# Patient Record
Sex: MALE | Race: WHITE | NOT HISPANIC OR LATINO | Employment: OTHER | URBAN - METROPOLITAN AREA
[De-identification: names, ages, dates, MRNs, and addresses within clinical notes are randomized per-mention and may not be internally consistent; named-entity substitution may affect disease eponyms.]

---

## 2021-08-12 LAB — CRC RECOMMENDATION EXT: NORMAL

## 2023-04-03 ENCOUNTER — CLINICAL SUPPORT (OUTPATIENT)
Dept: AUDIOLOGY | Facility: CLINIC | Age: 58
End: 2023-04-03
Payer: COMMERCIAL

## 2023-04-03 ENCOUNTER — OFFICE VISIT (OUTPATIENT)
Dept: OTOLARYNGOLOGY | Facility: CLINIC | Age: 58
End: 2023-04-03
Payer: COMMERCIAL

## 2023-04-03 VITALS — WEIGHT: 173.06 LBS | TEMPERATURE: 98 F

## 2023-04-03 DIAGNOSIS — H90.A22 SENSORINEURAL HEARING LOSS (SNHL) OF LEFT EAR WITH RESTRICTED HEARING OF RIGHT EAR: ICD-10-CM

## 2023-04-03 DIAGNOSIS — H93.12 TINNITUS, LEFT: Primary | ICD-10-CM

## 2023-04-03 DIAGNOSIS — H93.12 TINNITUS, LEFT: ICD-10-CM

## 2023-04-03 DIAGNOSIS — H90.3 SENSORINEURAL HEARING LOSS, ASYMMETRICAL: ICD-10-CM

## 2023-04-03 DIAGNOSIS — H90.3 SENSORINEURAL HEARING LOSS (SNHL), BILATERAL: Primary | ICD-10-CM

## 2023-04-03 PROCEDURE — 99203 PR OFFICE/OUTPT VISIT, NEW, LEVL III, 30-44 MIN: ICD-10-PCS | Mod: S$GLB,,, | Performed by: STUDENT IN AN ORGANIZED HEALTH CARE EDUCATION/TRAINING PROGRAM

## 2023-04-03 PROCEDURE — 99999 PR PBB SHADOW E&M-EST. PATIENT-LVL III: ICD-10-PCS | Mod: PBBFAC,,, | Performed by: STUDENT IN AN ORGANIZED HEALTH CARE EDUCATION/TRAINING PROGRAM

## 2023-04-03 PROCEDURE — 92567 PR TYMPA2METRY: ICD-10-PCS | Mod: S$GLB,,, | Performed by: AUDIOLOGIST-HEARING AID FITTER

## 2023-04-03 PROCEDURE — 92557 COMPREHENSIVE HEARING TEST: CPT | Mod: S$GLB,,, | Performed by: AUDIOLOGIST-HEARING AID FITTER

## 2023-04-03 PROCEDURE — 99203 OFFICE O/P NEW LOW 30 MIN: CPT | Mod: S$GLB,,, | Performed by: STUDENT IN AN ORGANIZED HEALTH CARE EDUCATION/TRAINING PROGRAM

## 2023-04-03 PROCEDURE — 92557 PR COMPREHENSIVE HEARING TEST: ICD-10-PCS | Mod: S$GLB,,, | Performed by: AUDIOLOGIST-HEARING AID FITTER

## 2023-04-03 PROCEDURE — 92567 TYMPANOMETRY: CPT | Mod: S$GLB,,, | Performed by: AUDIOLOGIST-HEARING AID FITTER

## 2023-04-03 PROCEDURE — 99999 PR PBB SHADOW E&M-NEW PATIENT-LVL II: ICD-10-PCS | Mod: PBBFAC,,, | Performed by: AUDIOLOGIST-HEARING AID FITTER

## 2023-04-03 PROCEDURE — 99999 PR PBB SHADOW E&M-NEW PATIENT-LVL II: CPT | Mod: PBBFAC,,, | Performed by: AUDIOLOGIST-HEARING AID FITTER

## 2023-04-03 PROCEDURE — 99999 PR PBB SHADOW E&M-EST. PATIENT-LVL III: CPT | Mod: PBBFAC,,, | Performed by: STUDENT IN AN ORGANIZED HEALTH CARE EDUCATION/TRAINING PROGRAM

## 2023-04-03 RX ORDER — ASPIRIN 81 MG/1
81 TABLET ORAL DAILY
COMMUNITY

## 2023-04-03 RX ORDER — FINASTERIDE 5 MG/1
5 TABLET, FILM COATED ORAL DAILY
COMMUNITY

## 2023-04-03 RX ORDER — TAMSULOSIN HYDROCHLORIDE 0.4 MG/1
CAPSULE ORAL DAILY
COMMUNITY

## 2023-04-03 RX ORDER — EZETIMIBE 10 MG/1
10 TABLET ORAL DAILY
COMMUNITY
End: 2023-11-01 | Stop reason: SDUPTHER

## 2023-04-03 RX ORDER — NIACIN 500 MG
250 CAPSULE, EXTENDED RELEASE ORAL NIGHTLY
COMMUNITY

## 2023-04-03 RX ORDER — LISINOPRIL 20 MG/1
20 TABLET ORAL DAILY
COMMUNITY
End: 2023-09-06 | Stop reason: SDUPTHER

## 2023-04-03 NOTE — PATIENT INSTRUCTIONS
Tinnitus (ringing in the ears)  Tinnitus is the perception of sound when no actual external noise is present. While it is commonly referred to as ringing in the ears, tinnitus can manifest many different perceptions of sound, including buzzing, hissing, whistling, swooshing, and clicking.     Preventative measures to help prevent and minimize tinnitus include:     Reduce exposure to extremely loud noise  Avoid total silence  Decrease salt intake  Monitor one's blood pressure  Avoid stimulants such as caffeine and nicotine  Exercise  Reduce fatigue  Manage stress    Sound Therapy: Sound therapy is a broad term that may be used in many ways. In general, sound therapy means the use of external noise in order to alter your perception of tinnitus. Like other tinnitus treatments, sound therapies do not cure the condition, but they may significantly lower the burden and intensity of tinnitus. Some options for sound therapy include:    Portable sound generator/sound machines: these can be purchased at certain electronic suppliers. They can produce soothing sounds that help to dampen your brain's recognition of the tinnitus.   Example of a portable and affordable sound machine: https://[employer]Gene/Portable-Relaxing-Soothing-Charging-Auto-Off/dp/D19A0DQYKJ/ref=pd_lpo_2?pd_rd_i=P09Q2ZBEIS&psc=1    Home masking: such as the use of electric fans, radios or television to dampen the tinnitus.    Music therapy: Many patients find that music, particularly classical passages that don't contain wide variations in loudness (ampliltude) can be both soothing to the limbic system (the emotional processor in the brain that is commonly negatively linked to a patient's reaction to tinnitus) and stimulating to the auditory cortex. There are several Apps available that have preselected music that can help with tinnitus. Some examples include: Audio Care, [place], Beltone Tinnitus Calmer, and MyNoise, among others.    Amplification: The use  of hearing aids and a combination of hearing aids and maskers are often effective ways to minimize tinnitus. While it is not clear whether hearing aids help by amplifying background sounds that can mask out the tinnitus or by actually altering the production of tinnitus, most hearing aid wearers report at least some reduction in their tinnitus. This may be due to the reduction in contrast between tinnitus and silence, or because of the new stimulation provided to the brain. This an especially helpful option when the patient also suffers from hearing loss as the hearing aids may simultaneously improving the hearing and tinnitus.    Tinnitus Retraining Therapy (TRT) does exist, but is not offered by any providers in our state. If all other measures fail and your tinnitus is debilitating it might be worth searching for TRT providers in nearby states. These techniques consist of two main components -- directive counseling and low level sound generators. Directive counseling provides intensive, individualized education regarding the causes and effects of tinnitus on the ear, the brain, and the coping mechanism. Low-level sound generators may help the brain relearn a pattern that will de-emphasize the importance of the tinnitus. These devices also may be helpful in desensitizing patients who are overly sensitive to sound.    There are also support groups that exist which can be helpful for some patients.     More helpful information can be found on the American Tinnitus Association website: https://www.estela.org/

## 2023-04-03 NOTE — PROGRESS NOTES
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  Patient complains of increase in left-sided tinnitus. Onset of symptoms was gradual starting a few years ago with acute worsening about 2-3 months. No inciting incident. A few months ago had pulsatile tinnitus AS that resolved after a few days, and following he has noted the ringing in his left ear has gotten louder. He does not appreciate changes in hearing. No known hearing loss. He has history of occupational noise history ([employer]); consistently used hearing protection. No dizziness.     Results reveal a normal-to-moderate sensorineural hearing loss 250-8000 Hz bilaterally with an asymmetry for the left ear at 5843-3949 Hz. Speech Reception Thresholds were 15 dBHL for the right ear and 15 dBHL for the left ear.   Word recognition scores were excellent for the right ear and excellent for the left ear.   Tympanograms were Type A for the right ear and Type A for the left ear.    Patient was counseled on the above findings.    Recommendations:  ENT  Audiologic monitoring for asymmetry in hearing (consider ABR or MRI, and/or audiogram monitoring)

## 2023-04-03 NOTE — PROGRESS NOTES
Chief complaint:   Chief Complaint   Patient presents with    Tinnitus          Referring Provider:  Lucrecia Self  No address on file    History of Present Illness:     Mr. Gross is a 57 y.o. male presenting for evaluation of tinnitus.     Patient presents with tinnitus. Onset of symptoms was gradual starting a few years ago with acute worsening about 2-3 months. No inciting incident. Had pulsating ringing  in the left ear around that time, but resolved after a few days. Then non-pulsatile ringing worsened after that.      Patient describes the tinnitus as constant located in the left ear. The quality is described as medium range pitch. The pattern is nonpulsatile with an intensity that is medium. Patient describes his level of annoyance as minimally annoying, always aware. Associated symptoms include no hearing loss, pain, dizziness, drainage or recurrent otitis. Previous treatments include none.      The patient also denies pain deep within the ear, tenderness around the ear canal or pre-auricular area, or headaches.         History        Past Medical History: No past medical history on file. .          Past Surgical History:No past surgical history on file..         Medications: Medication list was reviewed. He  has a current medication list which includes the following prescription(s): aspirin, ezetimibe, finasteride, lisinopril, niacin, and tamsulosin.         Allergies: Review of patient's allergies indicates:  No Known Allergies         Family history: family history is not on file.         Social History          Alcohol use:  has no history on file for alcohol use.            Tobacco:  reports that he has never smoked. He has never used smokeless tobacco.         Please see the patient's intake form for full details of past medical history, past surgical history, family history, social history and review of systems. ?This information was reviewed by me and noted.      Physical Examination     General:  Well developed, well nourished, well hydrated. Verbal with a strong voice and not dysphonic.     Head/Face: Normocephalic, atraumatic. No scars or lesions. Facial musculature equal.     Eyes: No scleral icterus or conjunctival hemorrhage. EOMI. PERRLA.     Ears:     Right ear: No gross deformity. EAC is clear of debris and erythema. The TM is intact with a pneumatized middle ear. No signs of retraction, fluid or infection.      Left ear: No gross deformity. EAC is clear of debris and erythema. The TM is intact with a pneumatized middle ear. No signs of retraction, fluid or infection.       Neurologic: Moving all extremities without gross abnormality.CN II-XII grossly intact. House-Brackmann 1/6. No signs of nystagmus.      Psych: Alert and oriented to person, place, and time with an appropriate mood and affect.       Audiogram     Audiogram was independently reviewed          Assessment     1. Sensorineural hearing loss (SNHL), bilateral    2. Tinnitus, left        Plan:      The diagnosis and options of management were discussed at length with the patient, including hearing function, hearing conservation, tinnitus management, and the risks of my recommendations. We spent a considerable amount of time discussing drug therapy, antioxidants, biofeedback, masking and sound therapy for tinnitus. I answered all questions in layman's terms. I recommend conservative therapies. Handout provided.     For his asymmetric Sensorineural Hearing Loss, it is relatively mild and not meeting MRI criteria. However, since his symptoms have recently changed I do recommend repeat audiogram in 6 months, sooner if he notices a change.     PT was likely physiologic. No other concerning symptoms. Has not recurred now in over 3 months. He will return if it recurs.                Spencer Orlando MD  [facility]   [facility]  [id number] [place] [address].  MIYA Snowden [id number]  P: [phone]  F:  [phone]

## 2023-06-15 ENCOUNTER — PATIENT MESSAGE (OUTPATIENT)
Dept: PRIMARY CARE CLINIC | Facility: CLINIC | Age: 58
End: 2023-06-15
Payer: COMMERCIAL

## 2023-06-21 ENCOUNTER — LAB VISIT (OUTPATIENT)
Dept: LAB | Facility: HOSPITAL | Age: 58
End: 2023-06-21
Attending: FAMILY MEDICINE
Payer: COMMERCIAL

## 2023-06-21 ENCOUNTER — OFFICE VISIT (OUTPATIENT)
Dept: INTERNAL MEDICINE | Facility: CLINIC | Age: 58
End: 2023-06-21
Payer: COMMERCIAL

## 2023-06-21 VITALS
HEIGHT: 68 IN | DIASTOLIC BLOOD PRESSURE: 80 MMHG | HEART RATE: 78 BPM | BODY MASS INDEX: 26.53 KG/M2 | SYSTOLIC BLOOD PRESSURE: 120 MMHG | WEIGHT: 175.06 LBS | OXYGEN SATURATION: 98 % | TEMPERATURE: 96 F

## 2023-06-21 DIAGNOSIS — G25.81 RLS (RESTLESS LEGS SYNDROME): ICD-10-CM

## 2023-06-21 DIAGNOSIS — E78.2 MODERATE MIXED HYPERLIPIDEMIA NOT REQUIRING STATIN THERAPY: ICD-10-CM

## 2023-06-21 DIAGNOSIS — Z00.00 ENCOUNTER FOR MEDICAL EXAMINATION TO ESTABLISH CARE: ICD-10-CM

## 2023-06-21 DIAGNOSIS — Z00.00 ENCOUNTER FOR MEDICAL EXAMINATION TO ESTABLISH CARE: Primary | ICD-10-CM

## 2023-06-21 PROBLEM — I10 ESSENTIAL HYPERTENSION: Status: ACTIVE | Noted: 2018-02-16

## 2023-06-21 PROBLEM — E11.9 DIABETES: Status: ACTIVE | Noted: 2018-02-16

## 2023-06-21 LAB
BASOPHILS # BLD AUTO: 0.05 K/UL (ref 0–0.2)
BASOPHILS NFR BLD: 0.9 % (ref 0–1.9)
DIFFERENTIAL METHOD: NORMAL
EOSINOPHIL # BLD AUTO: 0.1 K/UL (ref 0–0.5)
EOSINOPHIL NFR BLD: 1.1 % (ref 0–8)
ERYTHROCYTE [DISTWIDTH] IN BLOOD BY AUTOMATED COUNT: 12.2 % (ref 11.5–14.5)
FERRITIN SERPL-MCNC: 56 NG/ML (ref 20–300)
HCT VFR BLD AUTO: 45.7 % (ref 40–54)
HGB BLD-MCNC: 14.7 G/DL (ref 14–18)
IMM GRANULOCYTES # BLD AUTO: 0.02 K/UL (ref 0–0.04)
IMM GRANULOCYTES NFR BLD AUTO: 0.4 % (ref 0–0.5)
IRON SERPL-MCNC: 71 UG/DL (ref 45–160)
LYMPHOCYTES # BLD AUTO: 1.3 K/UL (ref 1–4.8)
LYMPHOCYTES NFR BLD: 23.5 % (ref 18–48)
MAGNESIUM SERPL-MCNC: 2.2 MG/DL (ref 1.6–2.6)
MCH RBC QN AUTO: 30.8 PG (ref 27–31)
MCHC RBC AUTO-ENTMCNC: 32.2 G/DL (ref 32–36)
MCV RBC AUTO: 96 FL (ref 82–98)
MONOCYTES # BLD AUTO: 0.4 K/UL (ref 0.3–1)
MONOCYTES NFR BLD: 7.9 % (ref 4–15)
NEUTROPHILS # BLD AUTO: 3.7 K/UL (ref 1.8–7.7)
NEUTROPHILS NFR BLD: 66.2 % (ref 38–73)
NRBC BLD-RTO: 0 /100 WBC
PLATELET # BLD AUTO: 234 K/UL (ref 150–450)
PMV BLD AUTO: 11.3 FL (ref 9.2–12.9)
RBC # BLD AUTO: 4.78 M/UL (ref 4.6–6.2)
SATURATED IRON: 17 % (ref 20–50)
TOTAL IRON BINDING CAPACITY: 414 UG/DL (ref 250–450)
TRANSFERRIN SERPL-MCNC: 280 MG/DL (ref 200–375)
TSH SERPL DL<=0.005 MIU/L-ACNC: 0.49 UIU/ML (ref 0.4–4)
WBC # BLD AUTO: 5.58 K/UL (ref 3.9–12.7)

## 2023-06-21 PROCEDURE — 36415 COLL VENOUS BLD VENIPUNCTURE: CPT | Performed by: FAMILY MEDICINE

## 2023-06-21 PROCEDURE — 84443 ASSAY THYROID STIM HORMONE: CPT | Performed by: FAMILY MEDICINE

## 2023-06-21 PROCEDURE — 82728 ASSAY OF FERRITIN: CPT | Performed by: FAMILY MEDICINE

## 2023-06-21 PROCEDURE — 85025 COMPLETE CBC W/AUTO DIFF WBC: CPT | Performed by: FAMILY MEDICINE

## 2023-06-21 PROCEDURE — 99204 OFFICE O/P NEW MOD 45 MIN: CPT | Mod: S$GLB,,, | Performed by: FAMILY MEDICINE

## 2023-06-21 PROCEDURE — 99204 PR OFFICE/OUTPT VISIT, NEW, LEVL IV, 45-59 MIN: ICD-10-PCS | Mod: S$GLB,,, | Performed by: FAMILY MEDICINE

## 2023-06-21 PROCEDURE — 83735 ASSAY OF MAGNESIUM: CPT | Performed by: FAMILY MEDICINE

## 2023-06-21 PROCEDURE — 84466 ASSAY OF TRANSFERRIN: CPT | Performed by: FAMILY MEDICINE

## 2023-06-21 PROCEDURE — 99999 PR PBB SHADOW E&M-EST. PATIENT-LVL III: ICD-10-PCS | Mod: PBBFAC,,, | Performed by: FAMILY MEDICINE

## 2023-06-21 PROCEDURE — 99999 PR PBB SHADOW E&M-EST. PATIENT-LVL III: CPT | Mod: PBBFAC,,, | Performed by: FAMILY MEDICINE

## 2023-06-21 RX ORDER — MULTIVITAMIN
1 TABLET ORAL DAILY
COMMUNITY

## 2023-06-21 RX ORDER — GABAPENTIN 300 MG/1
300 CAPSULE ORAL NIGHTLY
Qty: 30 CAPSULE | Refills: 11 | Status: SHIPPED | OUTPATIENT
Start: 2023-06-21 | End: 2023-11-01

## 2023-06-21 RX ORDER — AMOXICILLIN 500 MG
CAPSULE ORAL DAILY
COMMUNITY

## 2023-06-21 RX ORDER — CHOLECALCIFEROL (VITAMIN D3) 25 MCG
1000 TABLET ORAL DAILY
COMMUNITY

## 2023-06-21 NOTE — PROGRESS NOTES
Quique Gross  06/21/2023  [id number]    Primary Doctor No  Patient Care Team:  Primary Doctor No as PCP - General          Visit Type: New patient    Chief Complaint:  Chief Complaint   Patient presents with    Insomnia    restless leg     bilateral       History of Present Illness:  57 year old   New patient    Reports RLS and sleeplessness  He reports problems with sleeping.     History of BPH  On Proscar, Flomax.    HTN control with lisinopril    He does exercise 5 days a week  No issues with claudication.  He said at night, he feels that his legs have to move.  He does have to move.  Prolonged sitting, makes it worse.    Does have some insomnia, which he does not contribute to the RLS symptoms          History:  Past Medical History:   Diagnosis Date    Hypertension      Past Surgical History:   Procedure Laterality Date    HERNIA REPAIR       Family History   Problem Relation Age of Onset    Hypertension Mother     Dementia Mother      Social History     Socioeconomic History    Marital status:    Tobacco Use    Smoking status: Never    Smokeless tobacco: Never   Substance and Sexual Activity    Alcohol use: Yes     Comment: OCCASIONALLY     Patient Active Problem List   Diagnosis    Essential hypertension    Moderate mixed hyperlipidemia not requiring statin therapy     Review of patient's allergies indicates:  No Known Allergies    The following were reviewed at this visit: active problem list, medication list, allergies, family history, social history, and health maintenance.    Medications:  Current Outpatient Medications on File Prior to Visit   Medication Sig Dispense Refill    aspirin (ECOTRIN) 81 MG EC tablet Take 81 mg by mouth once daily.      ezetimibe (ZETIA) 10 mg tablet Take 10 mg by mouth once daily.      finasteride (PROSCAR) 5 mg tablet Take 5 mg by mouth once daily.      lisinopriL (PRINIVIL,ZESTRIL) 20 MG tablet Take 20 mg by mouth once daily.      multivitamin (ONE DAILY MULTIVITAMIN)  per tablet Take 1 tablet by mouth once daily.      niacin 500 MG CpSR Take 250 mg by mouth every evening.      omega-3 fatty acids/fish oil (FISH OIL-OMEGA-3 FATTY ACIDS) 300-1,000 mg capsule Take by mouth once daily.      tamsulosin (FLOMAX) 0.4 mg Cap Take by mouth once daily.      vitamin D (VITAMIN D3) 1000 units Tab Take 1,000 Units by mouth once daily.       No current facility-administered medications on file prior to visit.       Medications have been reviewed and reconciled with patient at this visit.  Barriers to medications reviewed with patient.    Adverse reactions to current medications reviewed with patient..    Over the counter medications reviewed and reconciled with patient.    Exam:  Wt Readings from Last 3 Encounters:   06/21/23 79.4 kg (175 lb 0.7 oz)   04/03/23 78.5 kg (173 lb 1 oz)     Temp Readings from Last 3 Encounters:   06/21/23 96 °F (35.6 °C) (Tympanic)   04/03/23 98.2 °F (36.8 °C)     BP Readings from Last 3 Encounters:   06/21/23 120/80     Pulse Readings from Last 3 Encounters:   06/21/23 78     Body mass index is 26.62 kg/m².      ROS  Physical Exam    Laboratory Reviewed ({N/A)  No results found for: WBC, HGB, HCT, PLT, CHOL, TRIG, HDL, LDLDIRECT, ALT, AST, NA, K, CL, CREATININE, BUN, CO2, TSH, PSA, INR, GLUF, HGBA1C, MICROALBUR    Quique was seen today for insomnia and restless leg.    Diagnoses and all orders for this visit:    Encounter for medical examination to establish care  -     TSH; Future  -     CBC Auto Differential; Future    RLS (restless legs syndrome)  -     FERRITIN; Future  -     Iron and TIBC; Future  -     Magnesium; Future    Moderate mixed hyperlipidemia not requiring statin therapy    Other orders  -     gabapentin (NEURONTIN) 300 MG capsule; Take 1 capsule (300 mg total) by mouth every evening.                Care Plan/Goals: Reviewed    Goals    None         Follow up: No follow-ups on file.    After visit summary was printed and given to patient upon  discharge today.  Patient goals and care plan are included in After Visit Summary.

## 2023-06-23 ENCOUNTER — PATIENT MESSAGE (OUTPATIENT)
Dept: INTERNAL MEDICINE | Facility: CLINIC | Age: 58
End: 2023-06-23
Payer: COMMERCIAL

## 2023-07-06 ENCOUNTER — PATIENT MESSAGE (OUTPATIENT)
Dept: INTERNAL MEDICINE | Facility: CLINIC | Age: 58
End: 2023-07-06
Payer: COMMERCIAL

## 2023-07-07 RX ORDER — PRAMIPEXOLE DIHYDROCHLORIDE 0.12 MG/1
0.12 TABLET ORAL NIGHTLY
Qty: 30 TABLET | Refills: 11 | Status: SHIPPED | OUTPATIENT
Start: 2023-07-07 | End: 2024-02-26 | Stop reason: SDUPTHER

## 2023-07-08 ENCOUNTER — PATIENT MESSAGE (OUTPATIENT)
Dept: ADMINISTRATIVE | Facility: HOSPITAL | Age: 58
End: 2023-07-08
Payer: COMMERCIAL

## 2023-07-12 DIAGNOSIS — I10 ESSENTIAL HYPERTENSION: ICD-10-CM

## 2023-09-05 ENCOUNTER — PATIENT MESSAGE (OUTPATIENT)
Dept: INTERNAL MEDICINE | Facility: CLINIC | Age: 58
End: 2023-09-05
Payer: COMMERCIAL

## 2023-09-06 RX ORDER — LISINOPRIL 20 MG/1
20 TABLET ORAL DAILY
Qty: 90 TABLET | Refills: 1 | Status: SHIPPED | OUTPATIENT
Start: 2023-09-06 | End: 2023-11-01 | Stop reason: SDUPTHER

## 2023-10-09 ENCOUNTER — OFFICE VISIT (OUTPATIENT)
Dept: OTOLARYNGOLOGY | Facility: CLINIC | Age: 58
End: 2023-10-09
Payer: COMMERCIAL

## 2023-10-09 ENCOUNTER — CLINICAL SUPPORT (OUTPATIENT)
Dept: AUDIOLOGY | Facility: CLINIC | Age: 58
End: 2023-10-09
Payer: COMMERCIAL

## 2023-10-09 VITALS — WEIGHT: 175.06 LBS | BODY MASS INDEX: 26.62 KG/M2

## 2023-10-09 DIAGNOSIS — H93.12 TINNITUS, LEFT: Primary | ICD-10-CM

## 2023-10-09 DIAGNOSIS — H93.12 TINNITUS, LEFT: ICD-10-CM

## 2023-10-09 DIAGNOSIS — H90.A22 SENSORINEURAL HEARING LOSS (SNHL) OF LEFT EAR WITH RESTRICTED HEARING OF RIGHT EAR: Primary | ICD-10-CM

## 2023-10-09 DIAGNOSIS — H90.3 SENSORINEURAL HEARING LOSS (SNHL), BILATERAL: ICD-10-CM

## 2023-10-09 PROCEDURE — 99213 OFFICE O/P EST LOW 20 MIN: CPT | Mod: S$GLB,,, | Performed by: STUDENT IN AN ORGANIZED HEALTH CARE EDUCATION/TRAINING PROGRAM

## 2023-10-09 PROCEDURE — 92550 TYMPANOMETRY & REFLEX THRESH: CPT | Mod: S$GLB,,, | Performed by: AUDIOLOGIST-HEARING AID FITTER

## 2023-10-09 PROCEDURE — 92553 PR AUDIOMETRY, AIR & BONE: ICD-10-PCS | Mod: S$GLB,,, | Performed by: AUDIOLOGIST-HEARING AID FITTER

## 2023-10-09 PROCEDURE — 99999 PR PBB SHADOW E&M-EST. PATIENT-LVL I: ICD-10-PCS | Mod: PBBFAC,,, | Performed by: AUDIOLOGIST-HEARING AID FITTER

## 2023-10-09 PROCEDURE — 92553 AUDIOMETRY AIR & BONE: CPT | Mod: S$GLB,,, | Performed by: AUDIOLOGIST-HEARING AID FITTER

## 2023-10-09 PROCEDURE — 92550 PR TYMPANOMETRY AND REFLEX THRESHOLD MEASUREMENTS: ICD-10-PCS | Mod: S$GLB,,, | Performed by: AUDIOLOGIST-HEARING AID FITTER

## 2023-10-09 PROCEDURE — 99999 PR PBB SHADOW E&M-EST. PATIENT-LVL I: CPT | Mod: PBBFAC,,, | Performed by: AUDIOLOGIST-HEARING AID FITTER

## 2023-10-09 PROCEDURE — 99999 PR PBB SHADOW E&M-EST. PATIENT-LVL III: ICD-10-PCS | Mod: PBBFAC,,, | Performed by: STUDENT IN AN ORGANIZED HEALTH CARE EDUCATION/TRAINING PROGRAM

## 2023-10-09 PROCEDURE — 99213 PR OFFICE/OUTPT VISIT, EST, LEVL III, 20-29 MIN: ICD-10-PCS | Mod: S$GLB,,, | Performed by: STUDENT IN AN ORGANIZED HEALTH CARE EDUCATION/TRAINING PROGRAM

## 2023-10-09 PROCEDURE — 99999 PR PBB SHADOW E&M-EST. PATIENT-LVL III: CPT | Mod: PBBFAC,,, | Performed by: STUDENT IN AN ORGANIZED HEALTH CARE EDUCATION/TRAINING PROGRAM

## 2023-10-09 NOTE — PROGRESS NOTES
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  Patient returns for monitoring for left-sided tinnitus and left SNHL asymmetry. No perceived changes in hearing or tinnitus since his previous audiogram from April 2023.     Results reveal a normal-to-severe sensorineural hearing loss 250-8000 Hz for the right ear, and a normal-to-severe sensorineural hearing loss 250-8000 Hz for the left ear.    Tympanograms were Type A for the right ear and Type A for the left ear. Ipsilateral acoustic reflexes were normal for the right ear and for the left ear. Contralateral right acoustic reflexes were absent. Contralateral left acoustic reflexes were normal.     Since his previous audiogram from 04/03/23, there is no change in hearing except for a decrease in the right ear at 6000 Hz.   Patient was counseled on the above findings.    Recommendations:  ENT at completion of this visit.  Annual audiogram to monitor asymmetry in hearing.

## 2023-10-09 NOTE — PROGRESS NOTES
Chief complaint:   Chief Complaint   Patient presents with    Tinnitus     Follow up appointment , still says it is the same on left side ,           Referring Provider:  No referring provider defined for this encounter.    History of Present Illness:     Mr. Gross is a 58 y.o. male presenting for evaluation of tinnitus.     Patient presents with tinnitus. Onset of symptoms was gradual starting a few years ago with acute worsening about 2-3 months. No inciting incident. Had pulsating ringing  in the left ear around that time, but resolved after a few days. Then non-pulsatile ringing worsened after that.      Patient describes the tinnitus as constant located in the left ear. The quality is described as medium range pitch. The pattern is nonpulsatile with an intensity that is medium. Patient describes his level of annoyance as minimally annoying, always aware. Associated symptoms include no hearing loss, pain, dizziness, drainage or recurrent otitis. Previous treatments include none.      The patient also denies pain deep within the ear, tenderness around the ear canal or pre-auricular area, or headaches.    Update 10/9/23     No significant change in hearing or left sided tinnitus since last visit. No dizziness.      History        Past Medical History:   Past Medical History:   Diagnosis Date    Hypertension     .          Past Surgical History:  Past Surgical History:   Procedure Laterality Date    HERNIA REPAIR     .         Medications: Medication list was reviewed. He  has a current medication list which includes the following prescription(s): aspirin, ezetimibe, finasteride, lisinopril, multivitamin, niacin, fish oil-omega-3 fatty acids, pramipexole, tamsulosin, vitamin d, and gabapentin.         Allergies: Review of patient's allergies indicates:  No Known Allergies         Family history: family history includes Dementia in his mother; Hypertension in his mother.         Social History          Alcohol use:   reports current alcohol use.            Tobacco:  reports that he has never smoked. He has never used smokeless tobacco.         Please see the patient's intake form for full details of past medical history, past surgical history, family history, social history and review of systems. ?This information was reviewed by me and noted.      Physical Examination     General: Well developed, well nourished, well hydrated. Verbal with a strong voice and not dysphonic.     Head/Face: Normocephalic, atraumatic. No scars or lesions. Facial musculature equal.     Eyes: No scleral icterus or conjunctival hemorrhage. EOMI. PERRLA.     Ears:     Right ear: No gross deformity. EAC is clear of debris and erythema. The TM is intact with a pneumatized middle ear. No signs of retraction, fluid or infection.      Left ear: No gross deformity. EAC is clear of debris and erythema. The TM is intact with a pneumatized middle ear. No signs of retraction, fluid or infection.       Neurologic: Moving all extremities without gross abnormality.CN II-XII grossly intact. House-Brackmann 1/6. No signs of nystagmus.      Psych: Alert and oriented to person, place, and time with an appropriate mood and affect.       Audiogram     Audiogram was independently reviewed                    Assessment     1. Tinnitus, left    2. Sensorineural hearing loss (SNHL), bilateral          Plan:      The diagnosis and options of management were discussed at length with the patient, including hearing function, hearing conservation, tinnitus management, and the risks of my recommendations. We spent a considerable amount of time discussing drug therapy, antioxidants, biofeedback, masking and sound therapy for tinnitus. I answered all questions in layman's terms. I recommend conservative therapies. Handout provided.     For his asymmetric Sensorineural Hearing Loss, it is relatively mild and not meeting MRI criteria. However, since his symptoms have recently changed  I do recommend repeat audiogram in 6 months, sooner if he notices a change.     PT was likely physiologic. No other concerning symptoms. Has not recurred now in over 3 months. He will return if it recurs.    Update 10/9/23  Symmetry less pronounced, as the right ear has progressed   Symptoms have not changed  Getting by currently  We discussed conservative measures for tinnitus and hearing preservation  Return to clinic 1 year for repeat audio, sooner if a change noted        Spencer Orlando MD  [facility]   [facility]  [id number] [place] [address].  [place]YA Snowden [address]  P: [phone]  F: [phone]

## 2023-11-01 ENCOUNTER — PATIENT MESSAGE (OUTPATIENT)
Dept: INTERNAL MEDICINE | Facility: CLINIC | Age: 58
End: 2023-11-01

## 2023-11-01 ENCOUNTER — LAB VISIT (OUTPATIENT)
Dept: LAB | Facility: HOSPITAL | Age: 58
End: 2023-11-01
Payer: COMMERCIAL

## 2023-11-01 ENCOUNTER — OFFICE VISIT (OUTPATIENT)
Dept: INTERNAL MEDICINE | Facility: CLINIC | Age: 58
End: 2023-11-01
Payer: COMMERCIAL

## 2023-11-01 VITALS
DIASTOLIC BLOOD PRESSURE: 84 MMHG | HEIGHT: 68 IN | OXYGEN SATURATION: 98 % | WEIGHT: 178.13 LBS | HEART RATE: 67 BPM | TEMPERATURE: 98 F | SYSTOLIC BLOOD PRESSURE: 118 MMHG | BODY MASS INDEX: 27 KG/M2

## 2023-11-01 DIAGNOSIS — I10 ESSENTIAL HYPERTENSION: ICD-10-CM

## 2023-11-01 DIAGNOSIS — Z00.00 ANNUAL PHYSICAL EXAM: Primary | ICD-10-CM

## 2023-11-01 DIAGNOSIS — E78.2 MODERATE MIXED HYPERLIPIDEMIA NOT REQUIRING STATIN THERAPY: ICD-10-CM

## 2023-11-01 DIAGNOSIS — Z00.00 ANNUAL PHYSICAL EXAM: ICD-10-CM

## 2023-11-01 LAB
ALBUMIN SERPL BCP-MCNC: 4.1 G/DL (ref 3.5–5.2)
ALP SERPL-CCNC: 46 U/L (ref 55–135)
ALT SERPL W/O P-5'-P-CCNC: 12 U/L (ref 10–44)
ANION GAP SERPL CALC-SCNC: 12 MMOL/L (ref 8–16)
AST SERPL-CCNC: 16 U/L (ref 10–40)
BILIRUB SERPL-MCNC: 0.4 MG/DL (ref 0.1–1)
BUN SERPL-MCNC: 14 MG/DL (ref 6–20)
CALCIUM SERPL-MCNC: 9.5 MG/DL (ref 8.7–10.5)
CHLORIDE SERPL-SCNC: 104 MMOL/L (ref 95–110)
CHOLEST SERPL-MCNC: 188 MG/DL (ref 120–199)
CHOLEST/HDLC SERPL: 3.7 {RATIO} (ref 2–5)
CO2 SERPL-SCNC: 21 MMOL/L (ref 23–29)
CREAT SERPL-MCNC: 1 MG/DL (ref 0.5–1.4)
EST. GFR  (NO RACE VARIABLE): >60 ML/MIN/1.73 M^2
ESTIMATED AVG GLUCOSE: 97 MG/DL (ref 68–131)
GLUCOSE SERPL-MCNC: 83 MG/DL (ref 70–110)
HBA1C MFR BLD: 5 % (ref 4–5.6)
HDLC SERPL-MCNC: 51 MG/DL (ref 40–75)
HDLC SERPL: 27.1 % (ref 20–50)
LDLC SERPL CALC-MCNC: 106.2 MG/DL (ref 63–159)
NONHDLC SERPL-MCNC: 137 MG/DL
POTASSIUM SERPL-SCNC: 5.3 MMOL/L (ref 3.5–5.1)
PROT SERPL-MCNC: 7 G/DL (ref 6–8.4)
SODIUM SERPL-SCNC: 137 MMOL/L (ref 136–145)
TRIGL SERPL-MCNC: 154 MG/DL (ref 30–150)

## 2023-11-01 PROCEDURE — 99396 PREV VISIT EST AGE 40-64: CPT | Mod: S$GLB,,,

## 2023-11-01 PROCEDURE — 99396 PR PREVENTIVE VISIT,EST,40-64: ICD-10-PCS | Mod: S$GLB,,,

## 2023-11-01 PROCEDURE — 80061 LIPID PANEL: CPT

## 2023-11-01 PROCEDURE — 83036 HEMOGLOBIN GLYCOSYLATED A1C: CPT

## 2023-11-01 PROCEDURE — 36415 COLL VENOUS BLD VENIPUNCTURE: CPT

## 2023-11-01 PROCEDURE — 80053 COMPREHEN METABOLIC PANEL: CPT

## 2023-11-01 PROCEDURE — 99999 PR PBB SHADOW E&M-EST. PATIENT-LVL IV: ICD-10-PCS | Mod: PBBFAC,,,

## 2023-11-01 PROCEDURE — 99999 PR PBB SHADOW E&M-EST. PATIENT-LVL IV: CPT | Mod: PBBFAC,,,

## 2023-11-01 RX ORDER — EZETIMIBE 10 MG/1
10 TABLET ORAL DAILY
Qty: 90 TABLET | Refills: 3 | Status: SHIPPED | OUTPATIENT
Start: 2023-11-01

## 2023-11-01 RX ORDER — LISINOPRIL 20 MG/1
20 TABLET ORAL DAILY
Qty: 90 TABLET | Refills: 3 | Status: SHIPPED | OUTPATIENT
Start: 2023-11-01

## 2023-11-01 NOTE — PROGRESS NOTES
Quique Gross  11/01/2023  [id number]    Kennedi Underwood MD  Patient Care Team:  Kennedi Underwood MD as PCP - General (Family Medicine)          Visit Type:a scheduled routine follow-up visit    Chief Complaint:  Chief Complaint   Patient presents with    Annual Exam        History of Present Illness:    58 year old male presents today for 6 month follow up exam        Reports RLS and sleeplessness  He reports problems with sleeping.      History of BPH  Followed by urology externally, Dr. Guzmán   On Proscar, Flomax.     HTN control with lisinopril       Does have some insomnia, which he does not contribute to the RLS symptoms      History:  Past Medical History:   Diagnosis Date    Hypertension      Past Surgical History:   Procedure Laterality Date    HERNIA REPAIR       Family History   Problem Relation Age of Onset    Hypertension Mother     Dementia Mother      Social History     Socioeconomic History    Marital status:    Tobacco Use    Smoking status: Never    Smokeless tobacco: Never   Substance and Sexual Activity    Alcohol use: Yes     Comment: OCCASIONALLY     Patient Active Problem List   Diagnosis    Essential hypertension    Moderate mixed hyperlipidemia not requiring statin therapy     Review of patient's allergies indicates:  No Known Allergies    The following were reviewed at this visit: active problem list, medication list, allergies, family history, social history, and health maintenance.    Medications:  Current Outpatient Medications on File Prior to Visit   Medication Sig Dispense Refill    aspirin (ECOTRIN) 81 MG EC tablet Take 81 mg by mouth once daily.      ezetimibe (ZETIA) 10 mg tablet Take 10 mg by mouth once daily.      finasteride (PROSCAR) 5 mg tablet Take 5 mg by mouth once daily.      gabapentin (NEURONTIN) 300 MG capsule Take 1 capsule (300 mg total) by mouth every evening. 30 capsule 11    lisinopriL (PRINIVIL,ZESTRIL) 20 MG tablet Take 1 tablet (20 mg total) by mouth  once daily. 90 tablet 1    multivitamin (ONE DAILY MULTIVITAMIN) per tablet Take 1 tablet by mouth once daily.      niacin 500 MG CpSR Take 250 mg by mouth every evening.      omega-3 fatty acids/fish oil (FISH OIL-OMEGA-3 FATTY ACIDS) 300-1,000 mg capsule Take by mouth once daily.      pramipexole (MIRAPEX) 0.125 MG tablet Take 1 tablet (0.125 mg total) by mouth every evening. 30 tablet 11    tamsulosin (FLOMAX) 0.4 mg Cap Take by mouth once daily.      vitamin D (VITAMIN D3) 1000 units Tab Take 1,000 Units by mouth once daily.       No current facility-administered medications on file prior to visit.       Medications have been reviewed and reconciled with patient at this visit.  Barriers to medications reviewed with patient.    Adverse reactions to current medications reviewed with patient..    Over the counter medications reviewed and reconciled with patient.    Exam:  Wt Readings from Last 3 Encounters:   10/09/23 79.4 kg (175 lb 0.7 oz)   06/21/23 79.4 kg (175 lb 0.7 oz)   04/03/23 78.5 kg (173 lb 1 oz)     Temp Readings from Last 3 Encounters:   06/21/23 96 °F (35.6 °C) (Tympanic)   04/03/23 98.2 °F (36.8 °C)     BP Readings from Last 3 Encounters:   06/21/23 120/80     Pulse Readings from Last 3 Encounters:   06/21/23 78     There is no height or weight on file to calculate BMI.      Review of Systems   Respiratory:  Negative for cough and shortness of breath.      Physical Exam  Vitals and nursing note reviewed.   Constitutional:       General: He is not in acute distress.     Appearance: He is well-developed. He is not diaphoretic.   HENT:      Head: Normocephalic and atraumatic.      Right Ear: External ear normal.      Left Ear: External ear normal.   Eyes:      General:         Right eye: No discharge.         Left eye: No discharge.      Conjunctiva/sclera: Conjunctivae normal.      Pupils: Pupils are equal, round, and reactive to light.   Cardiovascular:      Rate and Rhythm: Normal rate and regular  rhythm.      Heart sounds: Normal heart sounds. No murmur heard.  Pulmonary:      Effort: Pulmonary effort is normal. No respiratory distress.      Breath sounds: Normal breath sounds. No wheezing.   Neurological:      Mental Status: He is alert and oriented to person, place, and time.   Psychiatric:         Behavior: Behavior normal.         Thought Content: Thought content normal.         Judgment: Judgment normal.         Laboratory Reviewed ({Yes)  Lab Results   Component Value Date    WBC 5.58 06/21/2023    HGB 14.7 06/21/2023    HCT 45.7 06/21/2023     06/21/2023    TSH 0.492 06/21/2023       Quique was seen today for annual exam.    Diagnoses and all orders for this visit:    Annual physical exam  -     COMPREHENSIVE METABOLIC PANEL; Future  -     Lipid Panel; Future  -     HEMOGLOBIN A1C; Future    Essential hypertension  -     COMPREHENSIVE METABOLIC PANEL; Future  -     HEMOGLOBIN A1C; Future  -     lisinopriL (PRINIVIL,ZESTRIL) 20 MG tablet; Take 1 tablet (20 mg total) by mouth once daily.    Moderate mixed hyperlipidemia not requiring statin therapy  -     COMPREHENSIVE METABOLIC PANEL; Future  -     Lipid Panel; Future  -     HEMOGLOBIN A1C; Future  -     ezetimibe (ZETIA) 10 mg tablet; Take 1 tablet (10 mg total) by mouth once daily.      PEDRO LUIS for colonoscopy signed at visit     Will schedule a follow up exam with Dr. Underwood in 6 months       Care Plan/Goals: Reviewed    Goals    None         Follow up: No follow-ups on file.    After visit summary was printed and given to patient upon discharge today.  Patient goals and care plan are included in After Visit Summary.

## 2023-11-06 ENCOUNTER — PATIENT MESSAGE (OUTPATIENT)
Dept: INTERNAL MEDICINE | Facility: CLINIC | Age: 58
End: 2023-11-06
Payer: COMMERCIAL

## 2023-11-06 DIAGNOSIS — E87.5 SERUM POTASSIUM ELEVATED: Primary | ICD-10-CM

## 2023-11-09 ENCOUNTER — LAB VISIT (OUTPATIENT)
Dept: LAB | Facility: HOSPITAL | Age: 58
End: 2023-11-09
Payer: COMMERCIAL

## 2023-11-09 ENCOUNTER — PATIENT OUTREACH (OUTPATIENT)
Dept: ADMINISTRATIVE | Facility: HOSPITAL | Age: 58
End: 2023-11-09
Payer: COMMERCIAL

## 2023-11-09 DIAGNOSIS — E87.5 SERUM POTASSIUM ELEVATED: ICD-10-CM

## 2023-11-09 LAB — POTASSIUM SERPL-SCNC: 4.4 MMOL/L (ref 3.5–5.1)

## 2023-11-09 PROCEDURE — 84132 ASSAY OF SERUM POTASSIUM: CPT

## 2023-11-09 PROCEDURE — 36415 COLL VENOUS BLD VENIPUNCTURE: CPT

## 2023-11-13 ENCOUNTER — TELEPHONE (OUTPATIENT)
Dept: UROLOGY | Facility: CLINIC | Age: 58
End: 2023-11-13
Payer: COMMERCIAL

## 2023-11-13 NOTE — TELEPHONE ENCOUNTER
Tried to call pt there was no answer.       ----- Message from Lolis Proctor sent at 11/13/2023  2:42 PM CST -----  Contact: Quique  Patient is requesting for office to request his records from [facility]  Dr. Harish Guzmán for upcoming visit in 11/2024. Please notify pt through portal or cell      Thanks         Dr. Harish Guzmán   Address: [address], [place] [address]  Phone: [phone]

## 2023-11-21 ENCOUNTER — PATIENT MESSAGE (OUTPATIENT)
Dept: INTERNAL MEDICINE | Facility: CLINIC | Age: 58
End: 2023-11-21
Payer: COMMERCIAL

## 2024-02-19 ENCOUNTER — PATIENT MESSAGE (OUTPATIENT)
Dept: INTERNAL MEDICINE | Facility: CLINIC | Age: 59
End: 2024-02-19
Payer: COMMERCIAL

## 2024-02-26 ENCOUNTER — OFFICE VISIT (OUTPATIENT)
Dept: INTERNAL MEDICINE | Facility: CLINIC | Age: 59
End: 2024-02-26
Payer: COMMERCIAL

## 2024-02-26 VITALS
WEIGHT: 177.5 LBS | DIASTOLIC BLOOD PRESSURE: 80 MMHG | BODY MASS INDEX: 26.9 KG/M2 | OXYGEN SATURATION: 98 % | SYSTOLIC BLOOD PRESSURE: 124 MMHG | HEIGHT: 68 IN | HEART RATE: 96 BPM

## 2024-02-26 DIAGNOSIS — L25.9 CONTACT DERMATITIS, UNSPECIFIED CONTACT DERMATITIS TYPE, UNSPECIFIED TRIGGER: Primary | ICD-10-CM

## 2024-02-26 PROCEDURE — 99213 OFFICE O/P EST LOW 20 MIN: CPT | Mod: S$GLB,,, | Performed by: FAMILY MEDICINE

## 2024-02-26 PROCEDURE — 99999 PR PBB SHADOW E&M-EST. PATIENT-LVL III: CPT | Mod: PBBFAC,,, | Performed by: FAMILY MEDICINE

## 2024-02-26 RX ORDER — HYDROXYZINE PAMOATE 25 MG/1
25 CAPSULE ORAL 4 TIMES DAILY
Qty: 30 CAPSULE | Refills: 1 | Status: SHIPPED | OUTPATIENT
Start: 2024-02-26

## 2024-02-26 RX ORDER — PRAMIPEXOLE DIHYDROCHLORIDE 0.25 MG/1
0.25 TABLET ORAL NIGHTLY
Qty: 30 TABLET | Refills: 11 | Status: SHIPPED | OUTPATIENT
Start: 2024-02-26 | End: 2025-02-25

## 2024-02-26 RX ORDER — TRIAMCINOLONE ACETONIDE 1 MG/G
CREAM TOPICAL 2 TIMES DAILY
Qty: 80 G | Refills: 0 | Status: SHIPPED | OUTPATIENT
Start: 2024-02-26

## 2024-02-26 NOTE — PROGRESS NOTES
Quique Gross  02/26/2024  [id number]    Kennedi Underwood MD  Patient Care Team:  Kennedi Underwood MD as PCP - General (Family Medicine)          Visit Type:a scheduled routine follow-up visit    Chief Complaint:  Chief Complaint   Patient presents with    Rash       History of Present Illness:  58 year old here for rash.  He has a rash from pulling out lights on a fake alec tree.  He said rash developed and its was all over.  He was cutting if off the artificial tree  His whole body turn red.    Today he had it on his chest and his arms today  Goes away  He does take a benadryl.      History:  Past Medical History:   Diagnosis Date    Hypertension      Past Surgical History:   Procedure Laterality Date    HERNIA REPAIR       Family History   Problem Relation Age of Onset    Hypertension Mother     Dementia Mother      Social History     Socioeconomic History    Marital status:    Tobacco Use    Smoking status: Never    Smokeless tobacco: Never   Substance and Sexual Activity    Alcohol use: Yes     Comment: OCCASIONALLY     Social Determinants of Health     Financial Resource Strain: Low Risk  (2/22/2024)    Overall Financial Resource Strain (CARDIA)     Difficulty of Paying Living Expenses: Not very hard   Food Insecurity: No Food Insecurity (2/22/2024)    Hunger Vital Sign     Worried About Running Out of Food in the Last Year: Never true     Ran Out of Food in the Last Year: Never true   Transportation Needs: No Transportation Needs (2/22/2024)    PRAPARE - Transportation     Lack of Transportation (Medical): No     Lack of Transportation (Non-Medical): No   Physical Activity: Sufficiently Active (2/22/2024)    Exercise Vital Sign     Days of Exercise per Week: 5 days     Minutes of Exercise per Session: 30 min   Stress: No Stress Concern Present (2/22/2024)    [employer] - Occupational Stress Questionnaire     Feeling of Stress : Only a little   Social Connections:  Unknown (2/22/2024)    Social Connection and Isolation Panel [NHANES]     Frequency of Communication with Friends and Family: More than three times a week     Frequency of Social Gatherings with Friends and Family: Once a week     Active Member of Clubs or Organizations: Yes     Attends Club or Organization Meetings: 1 to 4 times per year     Marital Status:    Housing Stability: Unknown (2/22/2024)    Housing Stability Vital Sign     Unable to Pay for Housing in the Last Year: No     Unstable Housing in the Last Year: No     Patient Active Problem List   Diagnosis    Essential hypertension    Moderate mixed hyperlipidemia not requiring statin therapy    Colorectal polyp detected on colonoscopy     Review of patient's allergies indicates:  No Known Allergies    The following were reviewed at this visit: active problem list, medication list, allergies, family history, social history, and health maintenance.    Medications:  Current Outpatient Medications on File Prior to Visit   Medication Sig Dispense Refill    aspirin (ECOTRIN) 81 MG EC tablet Take 81 mg by mouth once daily.      ezetimibe (ZETIA) 10 mg tablet Take 1 tablet (10 mg total) by mouth once daily. 90 tablet 3    finasteride (PROSCAR) 5 mg tablet Take 5 mg by mouth once daily.      lisinopriL (PRINIVIL,ZESTRIL) 20 MG tablet Take 1 tablet (20 mg total) by mouth once daily. 90 tablet 3    niacin 500 MG CpSR Take 250 mg by mouth every evening.      omega-3 fatty acids/fish oil (FISH OIL-OMEGA-3 FATTY ACIDS) 300-1,000 mg capsule Take by mouth once daily.      pramipexole (MIRAPEX) 0.125 MG tablet Take 1 tablet (0.125 mg total) by mouth every evening. 30 tablet 11    tamsulosin (FLOMAX) 0.4 mg Cap Take by mouth once daily.      vitamin D (VITAMIN D3) 1000 units Tab Take 1,000 Units by mouth once daily.      multivitamin (ONE DAILY MULTIVITAMIN) per tablet Take 1 tablet by mouth once daily.       No current facility-administered medications on file prior  to visit.       Medications have been reviewed and reconciled with patient at this visit.  Barriers to medications reviewed with patient.    Adverse reactions to current medications reviewed with patient..    Over the counter medications reviewed and reconciled with patient.    Exam:  Wt Readings from Last 3 Encounters:   02/26/24 80.5 kg (177 lb 7.5 oz)   11/01/23 80.8 kg (178 lb 2.1 oz)   10/09/23 79.4 kg (175 lb 0.7 oz)     Temp Readings from Last 3 Encounters:   11/01/23 97.9 °F (36.6 °C) (Tympanic)   06/21/23 96 °F (35.6 °C) (Tympanic)   04/03/23 98.2 °F (36.8 °C)     BP Readings from Last 3 Encounters:   02/26/24 124/80   11/01/23 118/84   06/21/23 120/80     Pulse Readings from Last 3 Encounters:   02/26/24 96   11/01/23 67   06/21/23 78     Body mass index is 26.98 kg/m².      Review of Systems   HENT:  Negative for hearing loss.    Eyes:  Negative for discharge.   Respiratory:  Negative for wheezing.    Cardiovascular:  Negative for chest pain and palpitations.   Gastrointestinal:  Negative for blood in stool, constipation, diarrhea and vomiting.   Genitourinary:  Negative for hematuria and urgency.   Musculoskeletal:  Negative for neck pain.   Neurological:  Negative for weakness and headaches.   Endo/Heme/Allergies:  Negative for polydipsia.     Physical Exam  Nursing note reviewed.   Cardiovascular:      Rate and Rhythm: Normal rate and regular rhythm.   Pulmonary:      Effort: Pulmonary effort is normal. No respiratory distress.   Neurological:      Mental Status: He is alert and oriented to person, place, and time.   Psychiatric:         Mood and Affect: Mood normal.         Behavior: Behavior normal.         Thought Content: Thought content normal.         Judgment: Judgment normal.         Laboratory Reviewed ({Yes)  Lab Results   Component Value Date    WBC 5.58 06/21/2023    HGB 14.7 06/21/2023    HCT 45.7 06/21/2023     06/21/2023    CHOL 188 11/01/2023    TRIG 154 (H) 11/01/2023    HDL 51  11/01/2023    ALT 12 11/01/2023    AST 16 11/01/2023     11/01/2023    K 4.4 11/09/2023     11/01/2023    CREATININE 1.0 11/01/2023    BUN 14 11/01/2023    CO2 21 (L) 11/01/2023    TSH 0.492 06/21/2023    HGBA1C 5.0 11/01/2023       Quique was seen today for rash.    Diagnoses and all orders for this visit:    Contact dermatitis, unspecified contact dermatitis type, unspecified trigger    Other orders  -     hydrOXYzine pamoate (VISTARIL) 25 MG Cap; Take 1 capsule (25 mg total) by mouth 4 (four) times daily.  -     triamcinolone acetonide 0.1% (KENALOG) 0.1 % cream; Apply topically 2 (two) times daily.                Care Plan/Goals: Reviewed    Goals    None         Follow up: No follow-ups on file.    After visit summary was printed and given to patient upon discharge today.  Patient goals and care plan are included in After Visit Summary.    Answers submitted by the patient for this visit:  Review of Systems Questionnaire (Submitted on 2/22/2024)  activity change: No  unexpected weight change: No  neck pain: No  hearing loss: No  rhinorrhea: No  trouble swallowing: No  eye discharge: No  visual disturbance: No  chest tightness: No  wheezing: No  chest pain: No  palpitations: No  blood in stool: No  constipation: No  vomiting: No  diarrhea: No  polydipsia: No  polyuria: No  difficulty urinating: No  urgency: No  hematuria: No  joint swelling: No  arthralgias: No  headaches: No  weakness: No  confusion: No  dysphoric mood: No

## 2024-06-03 ENCOUNTER — PATIENT MESSAGE (OUTPATIENT)
Dept: INTERNAL MEDICINE | Facility: CLINIC | Age: 59
End: 2024-06-03
Payer: COMMERCIAL

## 2024-06-03 DIAGNOSIS — I10 ESSENTIAL HYPERTENSION: ICD-10-CM

## 2024-06-03 RX ORDER — LISINOPRIL 20 MG/1
20 TABLET ORAL DAILY
Qty: 90 TABLET | Refills: 3 | Status: SHIPPED | OUTPATIENT
Start: 2024-06-03

## 2024-06-03 NOTE — TELEPHONE ENCOUNTER
No care due was identified.  [facility] Embedded Care Due Messages. Reference number: [id number].   6/03/2024 2:51:18 PM CDT

## 2024-06-14 ENCOUNTER — PATIENT MESSAGE (OUTPATIENT)
Dept: INTERNAL MEDICINE | Facility: CLINIC | Age: 59
End: 2024-06-14
Payer: COMMERCIAL

## 2024-09-25 ENCOUNTER — PATIENT MESSAGE (OUTPATIENT)
Dept: INTERNAL MEDICINE | Facility: CLINIC | Age: 59
End: 2024-09-25
Payer: COMMERCIAL

## 2024-10-12 NOTE — TELEPHONE ENCOUNTER
No care due was identified.  [facility] Embedded Care Due Messages. Reference number: [id number].   10/12/2024 10:51:07 AM CDT

## 2024-10-13 ENCOUNTER — PATIENT MESSAGE (OUTPATIENT)
Dept: INTERNAL MEDICINE | Facility: CLINIC | Age: 59
End: 2024-10-13
Payer: COMMERCIAL

## 2024-10-14 ENCOUNTER — TELEPHONE (OUTPATIENT)
Dept: INTERNAL MEDICINE | Facility: CLINIC | Age: 59
End: 2024-10-14
Payer: COMMERCIAL

## 2024-10-14 ENCOUNTER — PATIENT MESSAGE (OUTPATIENT)
Dept: INTERNAL MEDICINE | Facility: CLINIC | Age: 59
End: 2024-10-14
Payer: COMMERCIAL

## 2024-10-14 RX ORDER — FINASTERIDE 5 MG/1
5 TABLET, FILM COATED ORAL DAILY
Qty: 30 TABLET | Refills: 0 | Status: SHIPPED | OUTPATIENT
Start: 2024-10-14

## 2024-10-14 RX ORDER — PRAMIPEXOLE DIHYDROCHLORIDE 0.25 MG/1
0.25 TABLET ORAL NIGHTLY
Qty: 90 TABLET | Refills: 1 | Status: SHIPPED | OUTPATIENT
Start: 2024-10-14

## 2024-10-14 NOTE — TELEPHONE ENCOUNTER
Refill Decision Note   Quique Gross  is requesting a refill authorization.  Brief Assessment and Rationale for Refill:  Approve     Medication Therapy Plan:         Comments:     Note composed:10:15 AM 10/14/2024

## 2024-10-14 NOTE — TELEPHONE ENCOUNTER
Patient is requesting a refill of finasteride (Proscar) 5mg tab. He originally received this prescription from Dr. Guzmán outside of [facility] but he no longer sees him and their office will not refill prescription unless an appt is scheduled. However, patient will be seeing a new urologist (Dr. Meade) on 11/14/24 and is requesting a small fill until he can get to that appt.    Please advise.

## 2024-10-14 NOTE — TELEPHONE ENCOUNTER
No care due was identified.  [facility] Embedded Care Due Messages. Reference number: [id number].   10/14/2024 3:48:38 PM CDT

## 2024-10-14 NOTE — TELEPHONE ENCOUNTER
----- Message from Lashonda sent at 10/14/2024  2:46 PM CDT -----  Contact: Mobile  [phone]  Requesting an RX refill or new RX.    Is this a refill or new RX: Refill    RX name and strength  finasteride (PROSCAR) 5 mg tablet    Is this a 30 day or 90 day RX: 90    Pharmacy name and phone #   [id number] - [place] - [address] [facility]  [address]  [place] [address]  Phone: [phone] Fax: [phone]      The doctors have asked that we provide their patients with the following 2 reminders -- prescription refills can take up to 72 hours, and a friendly reminder that in the future you can use your [facility] account to request refills:   Comment: Patient said that this script was prescribed by a non [facility] doctor.

## 2024-10-22 DIAGNOSIS — E78.2 MODERATE MIXED HYPERLIPIDEMIA NOT REQUIRING STATIN THERAPY: ICD-10-CM

## 2024-10-22 RX ORDER — EZETIMIBE 10 MG/1
10 TABLET ORAL
Qty: 90 TABLET | Refills: 3 | Status: SHIPPED | OUTPATIENT
Start: 2024-10-22

## 2024-10-22 NOTE — TELEPHONE ENCOUNTER
Refill Routing Note   Medication(s) are not appropriate for processing by [facility] for the following reason(s):        Non-participating provider    [facility] action(s):  Route               Appointments  past 12m or future 3m with PCP    Date Provider   Last Visit   11/1/2023 Niki Mitchell, NP   Next Visit   Visit date not found Niki Mitchell, NP   ED visits in past 90 days: 0        Note composed:1:49 PM 10/22/2024

## 2024-10-31 ENCOUNTER — OFFICE VISIT (OUTPATIENT)
Dept: INTERNAL MEDICINE | Facility: CLINIC | Age: 59
End: 2024-10-31
Payer: COMMERCIAL

## 2024-10-31 ENCOUNTER — LAB VISIT (OUTPATIENT)
Dept: LAB | Facility: HOSPITAL | Age: 59
End: 2024-10-31
Attending: FAMILY MEDICINE
Payer: COMMERCIAL

## 2024-10-31 VITALS
OXYGEN SATURATION: 95 % | DIASTOLIC BLOOD PRESSURE: 84 MMHG | WEIGHT: 174.19 LBS | HEART RATE: 79 BPM | BODY MASS INDEX: 26.4 KG/M2 | SYSTOLIC BLOOD PRESSURE: 124 MMHG | HEIGHT: 68 IN | TEMPERATURE: 97 F

## 2024-10-31 DIAGNOSIS — I10 ESSENTIAL HYPERTENSION: ICD-10-CM

## 2024-10-31 DIAGNOSIS — Z00.00 ANNUAL PHYSICAL EXAM: ICD-10-CM

## 2024-10-31 DIAGNOSIS — Z00.00 ANNUAL PHYSICAL EXAM: Primary | ICD-10-CM

## 2024-10-31 DIAGNOSIS — G25.81 RLS (RESTLESS LEGS SYNDROME): ICD-10-CM

## 2024-10-31 DIAGNOSIS — Z12.5 SCREENING FOR MALIGNANT NEOPLASM OF PROSTATE: ICD-10-CM

## 2024-10-31 LAB
ALBUMIN SERPL BCP-MCNC: 4 G/DL (ref 3.5–5.2)
ALP SERPL-CCNC: 51 U/L (ref 40–150)
ALT SERPL W/O P-5'-P-CCNC: 16 U/L (ref 10–44)
ANION GAP SERPL CALC-SCNC: 8 MMOL/L (ref 8–16)
AST SERPL-CCNC: 15 U/L (ref 10–40)
BASOPHILS # BLD AUTO: 0.06 K/UL (ref 0–0.2)
BASOPHILS NFR BLD: 1.2 % (ref 0–1.9)
BILIRUB SERPL-MCNC: 0.6 MG/DL (ref 0.1–1)
BUN SERPL-MCNC: 16 MG/DL (ref 8–23)
CALCIUM SERPL-MCNC: 9.5 MG/DL (ref 8.7–10.5)
CHLORIDE SERPL-SCNC: 105 MMOL/L (ref 95–110)
CHOLEST SERPL-MCNC: 196 MG/DL (ref 120–199)
CHOLEST/HDLC SERPL: 3.8 {RATIO} (ref 2–5)
CO2 SERPL-SCNC: 26 MMOL/L (ref 23–29)
COMPLEXED PSA SERPL-MCNC: 10 NG/ML (ref 0–4)
CREAT SERPL-MCNC: 1.1 MG/DL (ref 0.5–1.4)
DIFFERENTIAL METHOD BLD: NORMAL
EOSINOPHIL # BLD AUTO: 0.2 K/UL (ref 0–0.5)
EOSINOPHIL NFR BLD: 4.2 % (ref 0–8)
ERYTHROCYTE [DISTWIDTH] IN BLOOD BY AUTOMATED COUNT: 12.2 % (ref 11.5–14.5)
EST. GFR  (NO RACE VARIABLE): >60 ML/MIN/1.73 M^2
GLUCOSE SERPL-MCNC: 78 MG/DL (ref 70–110)
HCT VFR BLD AUTO: 48.4 % (ref 40–54)
HDLC SERPL-MCNC: 51 MG/DL (ref 40–75)
HDLC SERPL: 26 % (ref 20–50)
HGB BLD-MCNC: 15.5 G/DL (ref 14–18)
IMM GRANULOCYTES # BLD AUTO: 0.02 K/UL (ref 0–0.04)
IMM GRANULOCYTES NFR BLD AUTO: 0.4 % (ref 0–0.5)
LDLC SERPL CALC-MCNC: 117.8 MG/DL (ref 63–159)
LYMPHOCYTES # BLD AUTO: 1.6 K/UL (ref 1–4.8)
LYMPHOCYTES NFR BLD: 32.3 % (ref 18–48)
MCH RBC QN AUTO: 30.9 PG (ref 27–31)
MCHC RBC AUTO-ENTMCNC: 32 G/DL (ref 32–36)
MCV RBC AUTO: 96 FL (ref 82–98)
MONOCYTES # BLD AUTO: 0.5 K/UL (ref 0.3–1)
MONOCYTES NFR BLD: 9.7 % (ref 4–15)
NEUTROPHILS # BLD AUTO: 2.6 K/UL (ref 1.8–7.7)
NEUTROPHILS NFR BLD: 52.2 % (ref 38–73)
NONHDLC SERPL-MCNC: 145 MG/DL
NRBC BLD-RTO: 0 /100 WBC
PLATELET # BLD AUTO: 224 K/UL (ref 150–450)
PMV BLD AUTO: 11.8 FL (ref 9.2–12.9)
POTASSIUM SERPL-SCNC: 4.8 MMOL/L (ref 3.5–5.1)
PROT SERPL-MCNC: 6.9 G/DL (ref 6–8.4)
RBC # BLD AUTO: 5.02 M/UL (ref 4.6–6.2)
SODIUM SERPL-SCNC: 139 MMOL/L (ref 136–145)
TRIGL SERPL-MCNC: 136 MG/DL (ref 30–150)
WBC # BLD AUTO: 5.04 K/UL (ref 3.9–12.7)

## 2024-10-31 PROCEDURE — 36415 COLL VENOUS BLD VENIPUNCTURE: CPT | Performed by: FAMILY MEDICINE

## 2024-10-31 PROCEDURE — 80053 COMPREHEN METABOLIC PANEL: CPT | Performed by: FAMILY MEDICINE

## 2024-10-31 PROCEDURE — 85025 COMPLETE CBC W/AUTO DIFF WBC: CPT | Performed by: FAMILY MEDICINE

## 2024-10-31 PROCEDURE — 84153 ASSAY OF PSA TOTAL: CPT | Performed by: FAMILY MEDICINE

## 2024-10-31 PROCEDURE — 99396 PREV VISIT EST AGE 40-64: CPT | Mod: S$GLB,,, | Performed by: FAMILY MEDICINE

## 2024-10-31 PROCEDURE — 80061 LIPID PANEL: CPT | Performed by: FAMILY MEDICINE

## 2024-10-31 PROCEDURE — 99999 PR PBB SHADOW E&M-EST. PATIENT-LVL III: CPT | Mod: PBBFAC,,, | Performed by: FAMILY MEDICINE

## 2024-10-31 RX ORDER — PRAMIPEXOLE DIHYDROCHLORIDE 0.5 MG/1
0.5 TABLET ORAL NIGHTLY
Qty: 90 TABLET | Refills: 1 | Status: SHIPPED | OUTPATIENT
Start: 2024-10-31

## 2024-11-08 ENCOUNTER — PATIENT MESSAGE (OUTPATIENT)
Dept: INTERNAL MEDICINE | Facility: CLINIC | Age: 59
End: 2024-11-08
Payer: COMMERCIAL

## 2024-11-14 ENCOUNTER — OFFICE VISIT (OUTPATIENT)
Dept: UROLOGY | Facility: CLINIC | Age: 59
End: 2024-11-14
Payer: COMMERCIAL

## 2024-11-14 VITALS
HEIGHT: 68 IN | RESPIRATION RATE: 16 BRPM | HEART RATE: 98 BPM | SYSTOLIC BLOOD PRESSURE: 111 MMHG | WEIGHT: 175.5 LBS | DIASTOLIC BLOOD PRESSURE: 76 MMHG | BODY MASS INDEX: 26.6 KG/M2

## 2024-11-14 DIAGNOSIS — R97.20 ELEVATED PSA: Primary | ICD-10-CM

## 2024-11-14 DIAGNOSIS — N40.1 BENIGN PROSTATIC HYPERPLASIA WITH WEAK URINARY STREAM: ICD-10-CM

## 2024-11-14 DIAGNOSIS — R39.12 BENIGN PROSTATIC HYPERPLASIA WITH WEAK URINARY STREAM: ICD-10-CM

## 2024-11-14 PROCEDURE — 99204 OFFICE O/P NEW MOD 45 MIN: CPT | Mod: S$GLB,,, | Performed by: UROLOGY

## 2024-11-14 PROCEDURE — 99999 PR PBB SHADOW E&M-EST. PATIENT-LVL IV: CPT | Mod: PBBFAC,,, | Performed by: UROLOGY

## 2024-11-14 NOTE — PROGRESS NOTES
Chief Complaint:  BPH, elevated PSA    HPI:   Quique Gross is a 59 y.o. male that presents today to establish care.  Patient has been seeing Dr. Guzmán for the last several years.  He has BPH which has been well-controlled with Flomax and finasteride.  He also has elevated PSA.  Underwent an MRI and biopsy in 2021, both were negative for concerning findings.  He believes his baseline PSA is 4-5.  Most recent PSA was 10 obtain two weeks ago.  Does not think it has ever been that high.  No family history of prostate cancer.  No family history of other  cancers.  No prior kidney stones.  No gross hematuria or dysuria.  IPSS - 2/5/0/0/0/0/1 = 8  QOL - 0(delighted)    PMH:  Past Medical History:   Diagnosis Date    Hypertension        PSH:  Past Surgical History:   Procedure Laterality Date    HERNIA REPAIR         Family History:  Family History   Problem Relation Name Age of Onset    Hypertension Mother      Dementia Mother         Social History:  Social History     Tobacco Use    Smoking status: Never    Smokeless tobacco: Never   Substance Use Topics    Alcohol use: Yes     Comment: OCCASIONALLY        Review of Systems:  General: No fever, chills  Skin: No rashes  Chest:  Denies cough and sputum production  Heart: Denies chest pain  Resp: Denies dyspnea  Abdomen: Denies diarrhea, abdominal pain, hematemesis, or blood in stool.  Musculoskeletal: No joint stiffness or swelling. Denies back pain.  : see HPI  Neuro: no dizziness or weakness    Allergies:  Patient has no known allergies.    Medications:    Current Outpatient Medications:     aspirin (ECOTRIN) 81 MG EC tablet, Take 81 mg by mouth once daily., Disp: , Rfl:     ezetimibe (ZETIA) 10 mg tablet, TAKE 1 TABLET BY MOUTH EVERY DAY, Disp: 90 tablet, Rfl: 3    finasteride (PROSCAR) 5 mg tablet, Take 1 tablet (5 mg total) by mouth once daily., Disp: 30 tablet, Rfl: 0    lisinopriL (PRINIVIL,ZESTRIL) 20 MG tablet, Take 1 tablet (20 mg total) by mouth once  daily., Disp: 90 tablet, Rfl: 3    niacin 500 MG CpSR, Take 250 mg by mouth every evening., Disp: , Rfl:     omega-3 fatty acids/fish oil (FISH OIL-OMEGA-3 FATTY ACIDS) 300-1,000 mg capsule, Take by mouth once daily., Disp: , Rfl:     pramipexole (MIRAPEX) 0.5 MG tablet, Take 1 tablet (0.5 mg total) by mouth every evening., Disp: 90 tablet, Rfl: 1    tamsulosin (FLOMAX) 0.4 mg Cap, Take by mouth once daily., Disp: , Rfl:     vitamin D (VITAMIN D3) 1000 units Tab, Take 1,000 Units by mouth once daily., Disp: , Rfl:     Physical Exam:  Vitals:    11/14/24 1303   BP: 111/76   Pulse: 98   Resp: 16     Body mass index is 26.68 kg/m².  General: awake, alert, cooperative  Head: NC/AT  Ears: external ears normal  Eyes: sclera normal  Lungs: normal inspiration, NAD  Heart: well-perfused  Abdomen: Soft, NT, ND  : Normal phallus, meatus normal in size and position, BL testicles palpable, no masses, nontender, no abnormalities of epididymi  NATY: Normal rectal tone, no hemorrhoids. Prostate smooth and normal, no nodules 40 gm SV not palpable. Perineum and anus normal.  Lymphatic: groin nodes negative  Skin: The skin is warm and dry  Ext: No c/c/e.  Neuro: grossly intact, AOx3    RADIOLOGY:  No recent relevant imaging available for review.    LABS:  I personally reviewed the following lab values:  Lab Results   Component Value Date    WBC 5.04 10/31/2024    HGB 15.5 10/31/2024    HCT 48.4 10/31/2024     10/31/2024     10/31/2024    K 4.8 10/31/2024     10/31/2024    CREATININE 1.1 10/31/2024    BUN 16 10/31/2024    CO2 26 10/31/2024    TSH 0.492 06/21/2023    PSA 10.0 (H) 10/31/2024    HGBA1C 5.0 11/01/2023    CHOL 196 10/31/2024    TRIG 136 10/31/2024    HDL 51 10/31/2024    ALT 16 10/31/2024    AST 15 10/31/2024     Assessment/Plan:   Quique Gross is a 59 y.o. male with:    BPH - continue Flomax and finasteride    Elevated PSA - repeat PSA six weeks, follow-up for virtual to review    Homero Meade,  MD  Urology

## 2024-11-18 ENCOUNTER — PATIENT MESSAGE (OUTPATIENT)
Dept: UROLOGY | Facility: CLINIC | Age: 59
End: 2024-11-18
Payer: COMMERCIAL

## 2024-11-19 RX ORDER — FINASTERIDE 5 MG/1
5 TABLET, FILM COATED ORAL DAILY
Qty: 30 TABLET | Refills: 0 | Status: CANCELLED | OUTPATIENT
Start: 2024-11-19

## 2024-11-21 RX ORDER — FINASTERIDE 5 MG/1
5 TABLET, FILM COATED ORAL DAILY
Qty: 90 TABLET | Refills: 3 | Status: SHIPPED | OUTPATIENT
Start: 2024-11-21 | End: 2025-11-16

## 2024-12-18 ENCOUNTER — PATIENT MESSAGE (OUTPATIENT)
Dept: UROLOGY | Facility: CLINIC | Age: 59
End: 2024-12-18
Payer: COMMERCIAL

## 2025-01-06 ENCOUNTER — LAB VISIT (OUTPATIENT)
Dept: LAB | Facility: HOSPITAL | Age: 60
End: 2025-01-06
Attending: UROLOGY
Payer: COMMERCIAL

## 2025-01-06 DIAGNOSIS — R97.20 ELEVATED PSA: ICD-10-CM

## 2025-01-06 LAB — COMPLEXED PSA SERPL-MCNC: 9.3 NG/ML (ref 0–4)

## 2025-01-06 PROCEDURE — 36415 COLL VENOUS BLD VENIPUNCTURE: CPT | Performed by: UROLOGY

## 2025-01-06 PROCEDURE — 84153 ASSAY OF PSA TOTAL: CPT | Performed by: UROLOGY

## 2025-01-09 ENCOUNTER — OFFICE VISIT (OUTPATIENT)
Dept: UROLOGY | Facility: CLINIC | Age: 60
End: 2025-01-09
Payer: COMMERCIAL

## 2025-01-09 DIAGNOSIS — R97.20 ELEVATED PSA: Primary | ICD-10-CM

## 2025-01-09 NOTE — PROGRESS NOTES
The patient location is: [place]  The chief complaint leading to consultation is: elevated PSA    Visit type: audiovisual    Face to Face time with patient: 6 min  8 minutes of total time spent on the encounter, which includes face to face time and non-face to face time preparing to see the patient (eg, review of tests), Obtaining and/or reviewing separately obtained history, Documenting clinical information in the electronic or other health record, Independently interpreting results (not separately reported) and communicating results to the patient/family/caregiver, or Care coordination (not separately reported).     Each patient to whom he or she provides medical services by telemedicine is:  (1) informed of the relationship between the physician and patient and the respective role of any other health care provider with respect to management of the patient; and (2) notified that he or she may decline to receive medical services by telemedicine and may withdraw from such care at any time.      Chief Complaint:  BPH, elevated PSA    HPI:   01/09/2025 - returns today for follow-up and to review his PSA, PSA down slightly to 9.3, no gross hematuria or dysuria    11/14/2024 - 60 yo male that presents today to establish care.  Patient has been seeing Dr. Guzmán for the last several years.  He has BPH which has been well-controlled with Flomax and finasteride.  He also has elevated PSA.  Underwent an MRI and biopsy in 2021, both were negative for concerning findings.  He believes his baseline PSA is 4-5.  Most recent PSA was 10 obtain two weeks ago.  Does not think it has ever been that high.  No family history of prostate cancer.  No family history of other  cancers.  No prior kidney stones.  No gross hematuria or dysuria.  IPSS - 2/5/0/0/0/0/1 = 8  QOL - 0(delighted)    PMH:  Past Medical History:   Diagnosis Date    Hypertension        PSH:  Past Surgical History:   Procedure Laterality Date    HERNIA REPAIR          Family History:  Family History   Problem Relation Name Age of Onset    Hypertension Mother      Dementia Mother         Social History:  Social History     Tobacco Use    Smoking status: Never    Smokeless tobacco: Never   Substance Use Topics    Alcohol use: Yes     Comment: OCCASIONALLY        Review of Systems:  General: No fever, chills  Skin: No rashes  Chest:  Denies cough and sputum production  Heart: Denies chest pain  Resp: Denies dyspnea  Abdomen: Denies diarrhea, abdominal pain, hematemesis, or blood in stool.  Musculoskeletal: No joint stiffness or swelling. Denies back pain.  : see HPI  Neuro: no dizziness or weakness    Allergies:  Patient has no known allergies.    Medications:    Current Outpatient Medications:     aspirin (ECOTRIN) 81 MG EC tablet, Take 81 mg by mouth once daily., Disp: , Rfl:     ezetimibe (ZETIA) 10 mg tablet, TAKE 1 TABLET BY MOUTH EVERY DAY, Disp: 90 tablet, Rfl: 3    finasteride (PROSCAR) 5 mg tablet, Take 1 tablet (5 mg total) by mouth once daily., Disp: 90 tablet, Rfl: 3    lisinopriL (PRINIVIL,ZESTRIL) 20 MG tablet, Take 1 tablet (20 mg total) by mouth once daily., Disp: 90 tablet, Rfl: 3    niacin 500 MG CpSR, Take 250 mg by mouth every evening., Disp: , Rfl:     omega-3 fatty acids/fish oil (FISH OIL-OMEGA-3 FATTY ACIDS) 300-1,000 mg capsule, Take by mouth once daily., Disp: , Rfl:     pramipexole (MIRAPEX) 0.5 MG tablet, Take 1 tablet (0.5 mg total) by mouth every evening., Disp: 90 tablet, Rfl: 1    tamsulosin (FLOMAX) 0.4 mg Cap, Take by mouth once daily., Disp: , Rfl:     vitamin D (VITAMIN D3) 1000 units Tab, Take 1,000 Units by mouth once daily., Disp: , Rfl:     Physical Exam:  There were no vitals filed for this visit.    There is no height or weight on file to calculate BMI.  General: awake, alert, cooperative  Head: NC/AT  Ears: external ears normal  Eyes: sclera normal  Lungs: normal inspiration, NAD  Heart: well-perfused  Abdomen: Soft, NT, ND  :  Normal phallus, meatus normal in size and position, BL testicles palpable, no masses, nontender, no abnormalities of epididymi  NATY: Normal rectal tone, no hemorrhoids. Prostate smooth and normal, no nodules 40 gm SV not palpable. Perineum and anus normal.  Lymphatic: groin nodes negative  Skin: The skin is warm and dry  Ext: No c/c/e.  Neuro: grossly intact, AOx3    RADIOLOGY:  No recent relevant imaging available for review.    LABS:  I personally reviewed the following lab values:  Lab Results   Component Value Date    WBC 5.04 10/31/2024    HGB 15.5 10/31/2024    HCT 48.4 10/31/2024     10/31/2024     10/31/2024    K 4.8 10/31/2024     10/31/2024    CREATININE 1.1 10/31/2024    BUN 16 10/31/2024    CO2 26 10/31/2024    TSH 0.492 06/21/2023    PSA 10.0 (H) 10/31/2024    HGBA1C 5.0 11/01/2023    CHOL 196 10/31/2024    TRIG 136 10/31/2024    HDL 51 10/31/2024    ALT 16 10/31/2024    AST 15 10/31/2024     Assessment/Plan:   Quique Gross is a 59 y.o. male with:    BPH - continue Flomax and finasteride    Elevated PSA - repeat PSA continues to be elevated above his baseline, we will obtain repeat MRI, follow up after for discussion    Homero Meade MD  Urology

## 2025-01-22 ENCOUNTER — PATIENT MESSAGE (OUTPATIENT)
Dept: UROLOGY | Facility: CLINIC | Age: 60
End: 2025-01-22
Payer: COMMERCIAL

## 2025-01-24 RX ORDER — TAMSULOSIN HYDROCHLORIDE 0.4 MG/1
0.4 CAPSULE ORAL DAILY
Qty: 90 CAPSULE | Refills: 3 | Status: SHIPPED | OUTPATIENT
Start: 2025-01-24 | End: 2026-01-19

## 2025-01-28 ENCOUNTER — TELEPHONE (OUTPATIENT)
Dept: UROLOGY | Facility: CLINIC | Age: 60
End: 2025-01-28
Payer: COMMERCIAL

## 2025-01-28 DIAGNOSIS — R97.20 ELEVATED PSA: Primary | ICD-10-CM

## 2025-02-03 ENCOUNTER — HOSPITAL ENCOUNTER (OUTPATIENT)
Dept: RADIOLOGY | Facility: HOSPITAL | Age: 60
Discharge: HOME | End: 2025-02-03
Attending: UROLOGY
Payer: COMMERCIAL

## 2025-02-03 DIAGNOSIS — R97.20 ELEVATED PSA: ICD-10-CM

## 2025-02-03 PROCEDURE — 72197 MRI PELVIS W/O & W/DYE: CPT | Mod: TC

## 2025-02-03 PROCEDURE — 25500020 PHARM REV CODE 255: Performed by: UROLOGY

## 2025-02-03 PROCEDURE — 72197 MRI PELVIS W/O & W/DYE: CPT | Mod: 26,,, | Performed by: RADIOLOGY

## 2025-02-03 PROCEDURE — A9585 GADOBUTROL INJECTION: HCPCS | Performed by: UROLOGY

## 2025-02-03 RX ORDER — GADOBUTROL 604.72 MG/ML
8 INJECTION INTRAVENOUS
Status: COMPLETED | OUTPATIENT
Start: 2025-02-03 | End: 2025-02-03

## 2025-02-03 RX ADMIN — GADOBUTROL 8 ML: 604.72 INJECTION INTRAVENOUS at 01:02

## 2025-03-03 ENCOUNTER — PATIENT MESSAGE (OUTPATIENT)
Dept: INTERNAL MEDICINE | Facility: CLINIC | Age: 60
End: 2025-03-03
Payer: COMMERCIAL

## 2025-05-28 DIAGNOSIS — I10 ESSENTIAL HYPERTENSION: ICD-10-CM

## 2025-05-28 RX ORDER — LISINOPRIL 20 MG/1
20 TABLET ORAL DAILY
Qty: 90 TABLET | Refills: 1 | Status: SHIPPED | OUTPATIENT
Start: 2025-05-28

## 2025-05-28 NOTE — TELEPHONE ENCOUNTER
Refill Decision Note   Quique Ginny  is requesting a refill authorization.  Brief Assessment and Rationale for Refill:  Approve     Medication Therapy Plan:         Comments:     Note composed:12:46 PM 05/28/2025

## 2025-05-28 NOTE — TELEPHONE ENCOUNTER
No care due was identified.  [facility] Embedded Care Due Messages. Reference number: [id number].   5/28/2025 12:42:26 PM CDT